# Patient Record
Sex: FEMALE | Race: WHITE
[De-identification: names, ages, dates, MRNs, and addresses within clinical notes are randomized per-mention and may not be internally consistent; named-entity substitution may affect disease eponyms.]

---

## 2019-02-11 LAB
ERYTHROCYTE [DISTWIDTH] IN BLOOD BY AUTOMATED COUNT: 12.7 % (ref 11.5–14.5)
HCT VFR BLD CALC: 36.5 % (ref 36–48)
HGB BLD-MCNC: 12.5 G/DL (ref 12–16)
MCH RBC QN AUTO: 32.7 PG (ref 26–34)
MCHC RBC AUTO-ENTMCNC: 34.2 G/DL (ref 31–37)
MCV RBC: 95.5 FL (ref 80–100)
PLATELET # BLD: 264 10X3/UL (ref 130–400)
PMV BLD AUTO: 9.1 FL (ref 7.4–10.4)
RBC # BLD AUTO: 3.82 10X6/UL (ref 4–5.4)
WBC # BLD AUTO: 6.4 10X3/UL (ref 4.8–10.8)

## 2019-02-14 ENCOUNTER — HOSPITAL ENCOUNTER (OUTPATIENT)
Dept: HOSPITAL 84 - D.OPS | Age: 44
Discharge: HOME | End: 2019-02-14
Attending: PODIATRIST
Payer: COMMERCIAL

## 2019-02-14 VITALS — WEIGHT: 120 LBS | BODY MASS INDEX: 19.99 KG/M2 | BODY MASS INDEX: 19.99 KG/M2 | HEIGHT: 65 IN

## 2019-02-14 VITALS — SYSTOLIC BLOOD PRESSURE: 93 MMHG | DIASTOLIC BLOOD PRESSURE: 62 MMHG

## 2019-02-14 DIAGNOSIS — M21.612: ICD-10-CM

## 2019-02-14 DIAGNOSIS — M21.621: Primary | ICD-10-CM

## 2019-02-14 LAB — HCG UR QL: NEGATIVE

## 2019-02-14 NOTE — NUR
PATIENT DRESSED IN PERSONAL CLOTHING, BOOT TO LEFT FOOT IN PLACE.
RIGHT FOREARM PIV DC'D WITH TIP INTACT.  DISCHARGE INSTRUCTIONS
REVIEWED WITH PATIENT, DISCHARGED HOME VIA WHEELCHAIR TO PRIVATE
VEHICLE WITH SPOUSE